# Patient Record
Sex: FEMALE | Race: BLACK OR AFRICAN AMERICAN
[De-identification: names, ages, dates, MRNs, and addresses within clinical notes are randomized per-mention and may not be internally consistent; named-entity substitution may affect disease eponyms.]

---

## 2018-10-27 ENCOUNTER — HOSPITAL ENCOUNTER (EMERGENCY)
Dept: HOSPITAL 35 - ER | Age: 23
Discharge: HOME | End: 2018-10-27
Payer: COMMERCIAL

## 2018-10-27 VITALS — HEIGHT: 63 IN | WEIGHT: 170 LBS | BODY MASS INDEX: 30.12 KG/M2

## 2018-10-27 VITALS — SYSTOLIC BLOOD PRESSURE: 90 MMHG | DIASTOLIC BLOOD PRESSURE: 54 MMHG

## 2018-10-27 DIAGNOSIS — F17.210: ICD-10-CM

## 2018-10-27 DIAGNOSIS — J06.9: Primary | ICD-10-CM

## 2019-09-21 ENCOUNTER — HOSPITAL ENCOUNTER (EMERGENCY)
Dept: HOSPITAL 35 - ER | Age: 24
Discharge: HOME | End: 2019-09-21
Payer: COMMERCIAL

## 2019-09-21 VITALS — SYSTOLIC BLOOD PRESSURE: 129 MMHG | DIASTOLIC BLOOD PRESSURE: 81 MMHG

## 2019-09-21 VITALS — WEIGHT: 180.01 LBS | HEIGHT: 62 IN | BODY MASS INDEX: 33.13 KG/M2

## 2019-09-21 DIAGNOSIS — K21.9: ICD-10-CM

## 2019-09-21 DIAGNOSIS — F17.210: ICD-10-CM

## 2019-09-21 DIAGNOSIS — J06.9: Primary | ICD-10-CM

## 2020-05-11 ENCOUNTER — HOSPITAL ENCOUNTER (EMERGENCY)
Dept: HOSPITAL 35 - ER | Age: 25
Discharge: HOME | End: 2020-05-11
Payer: COMMERCIAL

## 2020-05-11 VITALS — SYSTOLIC BLOOD PRESSURE: 126 MMHG | DIASTOLIC BLOOD PRESSURE: 78 MMHG

## 2020-05-11 VITALS — WEIGHT: 195 LBS | HEIGHT: 62 IN | BODY MASS INDEX: 35.88 KG/M2

## 2020-05-11 DIAGNOSIS — K62.89: ICD-10-CM

## 2020-05-11 DIAGNOSIS — R10.30: ICD-10-CM

## 2020-05-11 DIAGNOSIS — K21.9: ICD-10-CM

## 2020-05-11 DIAGNOSIS — Z79.899: ICD-10-CM

## 2020-05-11 DIAGNOSIS — F17.210: ICD-10-CM

## 2020-05-11 DIAGNOSIS — R10.2: Primary | ICD-10-CM

## 2020-05-11 LAB
ALBUMIN SERPL-MCNC: 3.8 G/DL (ref 3.4–5)
ALT SERPL-CCNC: 44 U/L (ref 30–65)
ANION GAP SERPL CALC-SCNC: 7 MMOL/L (ref 7–16)
AST SERPL-CCNC: 28 U/L (ref 15–37)
BASOPHILS NFR BLD AUTO: 1 % (ref 0–2)
BILIRUB SERPL-MCNC: 0.2 MG/DL
BILIRUB UR-MCNC: NEGATIVE MG/DL
BUN SERPL-MCNC: 15 MG/DL (ref 7–18)
CALCIUM SERPL-MCNC: 8.5 MG/DL (ref 8.5–10.1)
CHLORIDE SERPL-SCNC: 104 MMOL/L (ref 98–107)
CO2 SERPL-SCNC: 26 MMOL/L (ref 21–32)
COLOR UR: YELLOW
CREAT SERPL-MCNC: 0.8 MG/DL (ref 0.6–1)
EOSINOPHIL NFR BLD: 4.2 % (ref 0–3)
ERYTHROCYTE [DISTWIDTH] IN BLOOD BY AUTOMATED COUNT: 13 % (ref 10.5–14.5)
GLUCOSE SERPL-MCNC: 86 MG/DL (ref 74–106)
GRANULOCYTES NFR BLD MANUAL: 46.9 % (ref 36–66)
HCT VFR BLD CALC: 43.4 % (ref 37–47)
HGB BLD-MCNC: 14.9 GM/DL (ref 12–15)
KETONES UR STRIP-MCNC: NEGATIVE MG/DL
LYMPHOCYTES NFR BLD AUTO: 42.1 % (ref 24–44)
MCH RBC QN AUTO: 32.4 PG (ref 26–34)
MCHC RBC AUTO-ENTMCNC: 34.3 G/DL (ref 28–37)
MCV RBC: 94.6 FL (ref 80–100)
MONOCYTES NFR BLD: 5.8 % (ref 1–8)
NEUTROPHILS # BLD: 4.3 THOU/UL (ref 1.4–8.2)
PLATELET # BLD: 231 THOU/UL (ref 150–400)
POTASSIUM SERPL-SCNC: 3.7 MMOL/L (ref 3.5–5.1)
PROT SERPL-MCNC: 7.1 G/DL (ref 6.4–8.2)
RBC # BLD AUTO: 4.59 MIL/UL (ref 4.2–5)
RBC # UR STRIP: NEGATIVE /UL
SODIUM SERPL-SCNC: 137 MMOL/L (ref 136–145)
SP GR UR STRIP: >= 1.03 (ref 1–1.03)
URINE CLARITY: CLEAR
URINE GLUCOSE-RANDOM*: NEGATIVE
URINE LEUKOCYTES-REFLEX: NEGATIVE
URINE NITRITE-REFLEX: NEGATIVE
URINE PROTEIN (DIPSTICK): NEGATIVE
UROBILINOGEN UR STRIP-ACNC: 0.2 E.U./DL (ref 0.2–1)
WBC # BLD AUTO: 9.2 THOU/UL (ref 4–11)

## 2020-06-05 ENCOUNTER — HOSPITAL ENCOUNTER (EMERGENCY)
Dept: HOSPITAL 35 - ER | Age: 25
Discharge: HOME | End: 2020-06-05
Payer: COMMERCIAL

## 2020-06-05 VITALS — SYSTOLIC BLOOD PRESSURE: 138 MMHG | DIASTOLIC BLOOD PRESSURE: 92 MMHG

## 2020-06-05 VITALS — WEIGHT: 194.01 LBS | BODY MASS INDEX: 34.38 KG/M2 | HEIGHT: 63 IN

## 2020-06-05 DIAGNOSIS — R07.89: Primary | ICD-10-CM

## 2020-06-05 DIAGNOSIS — F17.210: ICD-10-CM

## 2020-06-05 DIAGNOSIS — R42: ICD-10-CM

## 2020-06-05 DIAGNOSIS — K21.9: ICD-10-CM

## 2020-06-05 DIAGNOSIS — E66.9: ICD-10-CM

## 2020-06-05 DIAGNOSIS — M54.9: ICD-10-CM

## 2020-06-08 NOTE — EKG
[facility]
Nghia Fisher
[place]   [address]                     ELECTROCARDIOGRAM REPORT      
_______________________________________________________________________________
 
Name:       SCOT SALGADO             Room #:                     [facility]#:      [id number]                       Account #:      [id number]  
Admission:  20    Attend Phys:                          
Discharge:  20    Date of Birth:  [birth year]  
                                                          Report #: [id number]
                                                                    [id number]
_______________________________________________________________________________
THIS REPORT FOR:  
 
cc:  MADELEINE - Shaniqua family physician/PCP 
     MADELEINE - Shaniqua family physician/PCP 
     Keith Hu MD [facility]
THIS REPORT FOR:   //name//                          
 
                         [facility] ED
                                       
Test Date:    2020               Test Time:    10:40:53
Pat Name:     SCOT SALGADO        Department:   
Patient ID:   [id number]            Room:          
Gender:                               Technician:   oliver
:          1995               Requested By: Mckinley Winston
Order Number: [id number] MD:   Keith Hu
                                 Measurements
Intervals                              Axis          
Rate:         60                       P:            10
IL:           149                      QRS:          2
QRSD:         95                       T:            31
QT:           378                                    
QTc:          378                                    
                           Interpretive Statements
Sinus arrhythmia
Normal tracing
Compared to ECG 10/27/2018 08:44:12
T-wave abnormality no longer present
 
Electronically Signed On 2020 8:21:28 CDT by Keith Hu
[email or web address]
 
 
 
 
 
 
 
 
 
 
 
 
 
 
  <ELECTRONICALLY SIGNED>
   By: Keith Hu MD, FACC   
  20     0821
D: 20 1040                           _____________________________________
T: 20 1040                           Keith Hu MD, [facility]     /EPI

## 2021-05-22 ENCOUNTER — HOSPITAL ENCOUNTER (EMERGENCY)
Dept: HOSPITAL 35 - ER | Age: 26
Discharge: HOME | End: 2021-05-22
Payer: COMMERCIAL

## 2021-05-22 VITALS — SYSTOLIC BLOOD PRESSURE: 112 MMHG | DIASTOLIC BLOOD PRESSURE: 66 MMHG

## 2021-05-22 VITALS — WEIGHT: 183.01 LBS | BODY MASS INDEX: 33.68 KG/M2 | HEIGHT: 62 IN

## 2021-05-22 DIAGNOSIS — N39.0: ICD-10-CM

## 2021-05-22 DIAGNOSIS — F17.210: ICD-10-CM

## 2021-05-22 DIAGNOSIS — K21.9: ICD-10-CM

## 2021-05-22 DIAGNOSIS — R42: Primary | ICD-10-CM

## 2021-05-22 LAB
ALBUMIN SERPL-MCNC: 4.2 G/DL (ref 3.4–5)
ALT SERPL-CCNC: 38 U/L (ref 30–65)
ANION GAP SERPL CALC-SCNC: 8 MMOL/L (ref 7–16)
AST SERPL-CCNC: 22 U/L (ref 15–37)
BACTERIA-REFLEX: (no result) /HPF
BASOPHILS NFR BLD AUTO: 1.3 % (ref 0–2)
BILIRUB SERPL-MCNC: 0.3 MG/DL (ref 0.2–1)
BILIRUB UR-MCNC: NEGATIVE MG/DL
BUN SERPL-MCNC: 13 MG/DL (ref 7–18)
CALCIUM SERPL-MCNC: 9.5 MG/DL (ref 8.5–10.1)
CHLORIDE SERPL-SCNC: 104 MMOL/L (ref 98–107)
CO2 SERPL-SCNC: 27 MMOL/L (ref 21–32)
COLOR UR: YELLOW
CREAT SERPL-MCNC: 0.7 MG/DL (ref 0.6–1)
EOSINOPHIL NFR BLD: 2.3 % (ref 0–3)
ERYTHROCYTE [DISTWIDTH] IN BLOOD BY AUTOMATED COUNT: 12.8 % (ref 10.5–14.5)
GLUCOSE SERPL-MCNC: 96 MG/DL (ref 74–106)
GRANULOCYTES NFR BLD MANUAL: 52.8 % (ref 36–66)
HCT VFR BLD CALC: 45.1 % (ref 37–47)
HGB BLD-MCNC: 15.4 GM/DL (ref 12–15)
KETONES UR STRIP-MCNC: NEGATIVE MG/DL
LYMPHOCYTES NFR BLD AUTO: 33.3 % (ref 24–44)
MCH RBC QN AUTO: 33.3 PG (ref 26–34)
MCHC RBC AUTO-ENTMCNC: 34 G/DL (ref 28–37)
MCV RBC: 97.8 FL (ref 80–100)
MONOCYTES NFR BLD: 10.3 % (ref 1–8)
MUCUS: (no result) STRN/LPF
NEUTROPHILS # BLD: 4 THOU/UL (ref 1.4–8.2)
PLATELET # BLD: 244 THOU/UL (ref 150–400)
POTASSIUM SERPL-SCNC: 4.4 MMOL/L (ref 3.5–5.1)
PROT SERPL-MCNC: 7.6 G/DL (ref 6.4–8.2)
RBC # BLD AUTO: 4.62 MIL/UL (ref 4.2–5)
RBC # UR STRIP: (no result) /UL
RBC #/AREA URNS HPF: (no result) /HPF
SODIUM SERPL-SCNC: 139 MMOL/L (ref 136–145)
SP GR UR STRIP: >= 1.03 (ref 1–1.03)
SQUAMOUS: (no result) /LPF (ref 0–3)
URINE CLARITY: CLEAR
URINE GLUCOSE-RANDOM*: NEGATIVE
URINE LEUKOCYTES-REFLEX: (no result)
URINE NITRITE-REFLEX: NEGATIVE
URINE PROTEIN (DIPSTICK): NEGATIVE
URINE WBC-REFLEX: (no result) /HPF (ref 0–5)
UROBILINOGEN UR STRIP-ACNC: 0.2 E.U./DL (ref 0.2–1)
WBC # BLD AUTO: 7.6 THOU/UL (ref 4–11)

## 2021-05-23 NOTE — EKG
[address] [place]  [address]
Phone:  [phone]                    ELECTROCARDIOGRAM REPORT      
_______________________________________________________________________________
 
Name:       SCOT SALGADO             [facility]                     [id number]     Account #:      [id number]  
Admission:  21    Attend Phys:                          
Discharge:  21    Date of Birth:  [birth year]  
                                                          Report #: [id number]
   [id number]
_______________________________________________________________________________
                         [facility] ED
                                       
Test Date:    2021               Test Time:    12:47:12
Pat Name:     SCOT SALGADO        Department:   
Patient ID:   [id number]            Room:          
Gender:       F                        Technician:   Kuldip Donahue
:          1995               Requested By: Mony Richey
Order Number: [id number] MD:   Keith Hu
                                 Measurements
Intervals                              Axis          
Rate:         74                       P:            41
RI:           171                      QRS:          -13
QRSD:         93                       T:            27
QT:           362                                    
QTc:          402                                    
                           Interpretive Statements
Sinus rhythm
Normal tracing
Compared to ECG 2020 10:40:53
No significant change was found
Electronically Signed On 2021 15:16:49 CDT by Keith Hu
[email or web address]
 
 
 
 
 
 
 
 
 
 
 
 
 
 
 
 
 
 
 
 
 
  <ELECTRONICALLY SIGNED>
   By: Keith Hu MD, [facility]   
  21     1516
D: 21 1247                           _____________________________________
T: 21 1247                           Keith Hu MD, FACC     /EPI

## 2021-05-25 ENCOUNTER — HOSPITAL ENCOUNTER (EMERGENCY)
Dept: HOSPITAL 35 - ER | Age: 26
Discharge: HOME | End: 2021-05-25
Payer: COMMERCIAL

## 2021-05-25 VITALS — DIASTOLIC BLOOD PRESSURE: 76 MMHG | SYSTOLIC BLOOD PRESSURE: 116 MMHG

## 2021-05-25 VITALS — WEIGHT: 185.01 LBS | HEIGHT: 62 IN | BODY MASS INDEX: 34.05 KG/M2

## 2021-05-25 DIAGNOSIS — F17.210: ICD-10-CM

## 2021-05-25 DIAGNOSIS — Z87.42: ICD-10-CM

## 2021-05-25 DIAGNOSIS — R06.02: ICD-10-CM

## 2021-05-25 DIAGNOSIS — K21.9: ICD-10-CM

## 2021-05-25 DIAGNOSIS — R05: ICD-10-CM

## 2021-05-25 DIAGNOSIS — R53.83: ICD-10-CM

## 2021-05-25 DIAGNOSIS — F43.9: Primary | ICD-10-CM

## 2021-05-25 DIAGNOSIS — E66.9: ICD-10-CM
